# Patient Record
Sex: FEMALE | Race: WHITE | NOT HISPANIC OR LATINO | Employment: FULL TIME | URBAN - METROPOLITAN AREA
[De-identification: names, ages, dates, MRNs, and addresses within clinical notes are randomized per-mention and may not be internally consistent; named-entity substitution may affect disease eponyms.]

---

## 2018-09-27 LAB — GBS_EXT (HISTORICAL CONVERSION): POSITIVE

## 2018-10-08 ENCOUNTER — HOSPITAL ENCOUNTER (OUTPATIENT)
Dept: MEDSURG UNIT | Facility: CLINIC | Age: 28
Discharge: HOME | End: 2018-10-08
Attending: OBSTETRICS & GYNECOLOGY | Admitting: OBSTETRICS & GYNECOLOGY

## 2018-10-08 LAB — RUPTURE OF FETAL MEMBRANES BY ROM PLUS: NEGATIVE

## 2018-10-08 ASSESSMENT — MIFFLIN-ST. JEOR: SCORE: 1583.18

## 2018-10-12 ENCOUNTER — ANESTHESIA - HEALTHEAST (OUTPATIENT)
Dept: OBGYN | Facility: CLINIC | Age: 28
End: 2018-10-12

## 2018-10-15 ENCOUNTER — COMMUNICATION - HEALTHEAST (OUTPATIENT)
Dept: OBGYN | Facility: CLINIC | Age: 28
End: 2018-10-15

## 2018-11-23 ENCOUNTER — COMMUNICATION - HEALTHEAST (OUTPATIENT)
Dept: SCHEDULING | Facility: CLINIC | Age: 28
End: 2018-11-23

## 2021-05-03 ENCOUNTER — AMBULATORY - HEALTHEAST (OUTPATIENT)
Dept: OBGYN | Facility: CLINIC | Age: 31
End: 2021-05-03

## 2021-05-03 DIAGNOSIS — Z33.1 PREGNANT STATE, INCIDENTAL: ICD-10-CM

## 2021-05-08 ENCOUNTER — AMBULATORY - HEALTHEAST (OUTPATIENT)
Dept: FAMILY MEDICINE | Facility: CLINIC | Age: 31
End: 2021-05-08

## 2021-05-08 DIAGNOSIS — Z33.1 PREGNANT STATE, INCIDENTAL: ICD-10-CM

## 2021-05-08 LAB
SARS-COV-2 PCR COMMENT: NORMAL
SARS-COV-2 RNA SPEC QL NAA+PROBE: NEGATIVE
SARS-COV-2 VIRUS SPECIMEN SOURCE: NORMAL

## 2021-05-09 ENCOUNTER — COMMUNICATION - HEALTHEAST (OUTPATIENT)
Dept: SCHEDULING | Facility: CLINIC | Age: 31
End: 2021-05-09

## 2021-05-11 ENCOUNTER — HOSPITAL ENCOUNTER (OUTPATIENT)
Dept: OBGYN | Facility: CLINIC | Age: 31
Discharge: HOME | End: 2021-05-11

## 2021-05-11 ENCOUNTER — ANESTHESIA - HEALTHEAST (OUTPATIENT)
Dept: OBGYN | Facility: CLINIC | Age: 31
End: 2021-05-11

## 2021-05-13 ENCOUNTER — COMMUNICATION - HEALTHEAST (OUTPATIENT)
Dept: OBGYN | Facility: CLINIC | Age: 31
End: 2021-05-13

## 2021-05-20 ENCOUNTER — COMMUNICATION - HEALTHEAST (OUTPATIENT)
Dept: ADMINISTRATIVE | Facility: CLINIC | Age: 31
End: 2021-05-20

## 2021-05-20 ENCOUNTER — RECORDS - HEALTHEAST (OUTPATIENT)
Dept: ADMINISTRATIVE | Facility: OTHER | Age: 31
End: 2021-05-20

## 2021-05-20 RX ORDER — MUPIROCIN 20 MG/G
1 OINTMENT TOPICAL 4 TIMES DAILY PRN
Status: SHIPPED | COMMUNITY
Start: 2021-05-19 | End: 2024-04-02

## 2021-06-02 ENCOUNTER — RECORDS - HEALTHEAST (OUTPATIENT)
Dept: ADMINISTRATIVE | Facility: CLINIC | Age: 31
End: 2021-06-02

## 2021-06-02 VITALS — HEIGHT: 67 IN | WEIGHT: 182 LBS | BODY MASS INDEX: 28.56 KG/M2

## 2021-06-16 PROBLEM — Z12.4 CERVICAL CANCER SCREENING: Status: ACTIVE | Noted: 2017-05-22

## 2021-06-17 NOTE — TELEPHONE ENCOUNTER
OB Follow Up Phone Call    Patient: Gretchen Voss  : 1990  MRN: [id number]     Location [facility]  Provider Pippa Mcnamara MD      :   N/A    Language:   English    Discharge Follow-Up:  Follow-Up call by Outreach nurse: Message left for patient    Type of Delivery:      Feeding Method:  Breastfeeding    Comments:   Left message with [facility] Outreach phone number for patient to call back if desired. Reminded patient to schedule postpartum follow-up appointment as directed by PCP at discharge.  Encouraged patient to call clinic/PCP with questions or concerns.    Completed by:   Tyra Mckeon RN      Patient: Gretchen Voss  : 1990  MRN: [id number]

## 2021-06-17 NOTE — TELEPHONE ENCOUNTER
"Returned Gretchen's call:  Baby now 9 days old, and and has been nursing well, for about 30 min/session, and having good number of wet and soiled diapers.  Nipples have been comfortable.  However, for the last two feedings baby has been frustrated with feeding, \"screaming\" when brought to breast and unable to latch.  Gretchen applied nipple shield, which was effective and baby was able to latch.  Also tried calming with pacifier, which was briefly effective, but for second attempt pumped and gave small feeding in bottle.  Has not done any regular bottlefeeding.  Of note, Gretchen did not   attempt breastfeeding with first child, and moved to exclusive pumping after a short time with second because of nipple pain.      A:  Baby with difficulty latching    P:  Suggested calming baby before attempting latch with skin-to-skin. Explained that some babies can become frustrated seeking the nipple and need a bit more guidance--Discussed \"sandwich\" hold for breast, tucking baby's chin into breast and bringing baby's head onto breast for deep latch.  If baby still upset and having difficulty latching, could use nipple shield, or offer a small amount of pumped milk for calming and try again.  Offered in-person lactation consult if needed. Gretchen will consider.  "

## 2021-06-17 NOTE — PLAN OF CARE
Dr Mcnamara states to start high dose pitocin but ok to modify rate to obtain contractions every 2-3 minutes.

## 2021-06-17 NOTE — ANESTHESIA PROCEDURE NOTES
Epidural Block    Patient location during procedure: OB  Time Called: 5/11/2021 12:30 PM  Reason for Block:labor epidural  Staffing:  Performing  Anesthesiologist: Chong Sales MD  Preanesthetic Checklist  Completed: patient identified, risks, benefits, and alternatives discussed, timeout performed, consent obtained, at patient's request, airway assessed, oxygen available, suction available, emergency drugs available and hand hygiene performed  Procedure  Patient position: sitting  Prep: ChloraPrep and site prepped and draped  Patient monitoring: continuous pulse oximetry, heart rate and blood pressure  Approach: midline  Location: L3-L4  Injection technique: SASKIA air  Number of Attempts:1  Needle  Needle type: Cami   Needle gauge: 18 G     Catheter in Space: 4  Assessment  Sensory level: T10  No complications

## 2021-06-17 NOTE — ANESTHESIA POSTPROCEDURE EVALUATION
Patient: Gretchen Voss  * No procedures listed *  Anesthesia type: epidural    Patient location: Labor and Delivery  Last vitals:   Vitals Value Taken Time   /62 05/11/21 1750   Temp  05/11/21 1801   Pulse 73 05/11/21 1750   Resp  05/11/21 1801   SpO2  05/11/21 1801   Vitals shown include unvalidated device data.  Post vital signs: stable  Level of consciousness: awake and responds to simple questions  Post-anesthesia pain: pain controlled  Post-anesthesia nausea and vomiting: no  Pulmonary: unassisted, return to baseline  Cardiovascular: stable and blood pressure at baseline  Hydration: adequate  Anesthetic events: no

## 2021-06-17 NOTE — ANESTHESIA PREPROCEDURE EVALUATION
Anesthesia Evaluation      Patient summary reviewed   No history of anesthetic complications     Airway   Mallampati: I  Neck ROM: full   Pulmonary - negative ROS                          Cardiovascular - negative ROS   Neuro/Psych - negative ROS     Endo/Other    (+) pregnant     GI/Hepatic/Renal - negative ROS           Dental - normal exam                        Anesthesia Plan  Planned anesthetic: epidural    ASA 2     Anesthetic plan and risks discussed with: patient    Post-op plan: routine recovery

## 2021-06-20 NOTE — PROGRESS NOTES
Patient presents to [facility] triage with complaints of wet spot in underwear this am.  ROM test negative.  FHR reactive category 1 tracing, no contractions.  Dr. JENNA Lipscomb updated of same.   Will DC patient to home.

## 2021-06-21 NOTE — ANESTHESIA PREPROCEDURE EVALUATION
Anesthesia Evaluation      Patient summary reviewed     Airway   Mallampati: II  Neck ROM: full   Pulmonary - negative ROS                          Cardiovascular - negative ROS   Neuro/Psych      Endo/Other    (+) pregnant     GI/Hepatic/Renal - negative ROS           Dental                         Anesthesia Plan  Planned anesthetic: epidural  Labor epidural  ASA 2     Anesthetic plan and risks discussed with: patient and spouse    Post-op plan: routine recovery        Patient requests labor analgesia.  Patient identified.  Medical history reviewed; lab results and allergies reviewed.  Patient interviewed and examined.  Risks(including headache, back pain, low blood pressure, high block and related complications, failed block and/or inadequate analgesia, infection, bleeding and nerve injury), alternatives and procedure discussed and questions answered. Patient gives consent for epidural.  Correct patient, procedure/site verified.Hands washed, sterile gloves and mask worn.

## 2021-06-21 NOTE — ANESTHESIA POSTPROCEDURE EVALUATION
Patient: Gretchen Voss  * No procedures listed *  Anesthesia type: epidural    Patient location: Labor and Delivery  Last vitals:   Vitals:    10/12/18 2105   BP: 127/74   Pulse: 75   Resp:    Temp:    SpO2:      Post vital signs: stable  Level of consciousness: awake and responds to simple questions  Post-anesthesia pain: pain controlled  Post-anesthesia nausea and vomiting: no  Pulmonary: unassisted, return to baseline  Cardiovascular: stable and blood pressure at baseline  Hydration: adequate  Anesthetic events: no    QCDR Measures:  ASA# 11 - Lyla-op Cardiac Arrest: ASA11B - Patient did NOT experience unanticipated cardiac arrest  ASA# 12 - Lyla-op Mortality Rate: ASA12B - Patient did NOT die  ASA# 13 - PACU Re-Intubation Rate: NA - No ETT / LMA used for case  ASA# 10 - Composite Anes Safety: ASA10A - No serious adverse event    Additional Notes:  Recovering well after labor epidural, no apparent complications.  
Detail Level: Detailed

## 2021-06-21 NOTE — ANESTHESIA PROCEDURE NOTES
Epidural Block    Patient location during procedure: OB  Time Called: 10/12/2018 4:05 PM  Reason for Block:labor epidural  Staffing:  Performing  Anesthesiologist: MATTHEW HICKS  Preanesthetic Checklist  Completed: patient identified, risks, benefits, and alternatives discussed, timeout performed, consent obtained, at patient's request, airway assessed, oxygen available, suction available, emergency drugs available and hand hygiene performed  Procedure  Patient position: sitting  Prep: ChloraPrep  Patient monitoring: continuous pulse oximetry and blood pressure  Approach: midline  Location: L2-L3  Injection technique: SASKIA saline  Number of Attempts:1  Needle  Needle type: Cami   Needle gauge: 18 G     Catheter in Space: 4  Assessment  Sensory level:  No complications

## 2021-07-14 PROBLEM — Z34.90 PREGNANT: Status: RESOLVED | Noted: 2018-10-12 | Resolved: 2021-05-11

## 2024-04-02 ENCOUNTER — HOSPITAL ENCOUNTER (OUTPATIENT)
Facility: CLINIC | Age: 34
Setting detail: OBSERVATION
Discharge: HOME | End: 2024-04-03
Attending: EMERGENCY MEDICINE | Admitting: OBSTETRICS & GYNECOLOGY
Payer: COMMERCIAL

## 2024-04-02 ENCOUNTER — APPOINTMENT (OUTPATIENT)
Dept: CT IMAGING | Facility: CLINIC | Age: 34
End: 2024-04-02
Attending: PHYSICIAN ASSISTANT
Payer: COMMERCIAL

## 2024-04-02 ENCOUNTER — APPOINTMENT (OUTPATIENT)
Dept: ULTRASOUND IMAGING | Facility: CLINIC | Age: 34
End: 2024-04-02
Attending: PHYSICIAN ASSISTANT
Payer: COMMERCIAL

## 2024-04-02 DIAGNOSIS — N83.202 HEMORRHAGIC CYST OF LEFT OVARY: ICD-10-CM

## 2024-04-02 DIAGNOSIS — K66.1 HEMOPERITONEUM: ICD-10-CM

## 2024-04-02 DIAGNOSIS — R10.84 DIFFUSE ABDOMINAL PAIN: ICD-10-CM

## 2024-04-02 DIAGNOSIS — R07.89 RIGHT-SIDED CHEST WALL PAIN: ICD-10-CM

## 2024-04-02 LAB
ABO/RH(D): NORMAL
ALBUMIN SERPL BCG-MCNC: 4.6 G/DL (ref 3.5–5.2)
ALBUMIN UR-MCNC: 10 MG/DL
ALP SERPL-CCNC: 33 U/L (ref 40–150)
ALT SERPL W P-5'-P-CCNC: 9 U/L (ref 0–50)
ANION GAP SERPL CALCULATED.3IONS-SCNC: 9 MMOL/L (ref 7–15)
ANTIBODY SCREEN: NEGATIVE
APPEARANCE UR: CLEAR
AST SERPL W P-5'-P-CCNC: 12 U/L (ref 0–45)
ATRIAL RATE - MUSE: 101 BPM
BASOPHILS # BLD AUTO: 0 10E3/UL (ref 0–0.2)
BASOPHILS NFR BLD AUTO: 0 %
BILIRUB DIRECT SERPL-MCNC: <0.2 MG/DL (ref 0–0.3)
BILIRUB SERPL-MCNC: 0.8 MG/DL
BILIRUB UR QL STRIP: NEGATIVE
BUN SERPL-MCNC: 16.6 MG/DL (ref 6–20)
CALCIUM SERPL-MCNC: 10.2 MG/DL (ref 8.6–10)
CHLORIDE SERPL-SCNC: 105 MMOL/L (ref 98–107)
COLOR UR AUTO: YELLOW
CREAT SERPL-MCNC: 0.81 MG/DL (ref 0.51–0.95)
CRP SERPL-MCNC: 3.44 MG/L
D DIMER PPP FEU-MCNC: 0.76 UG/ML FEU (ref 0–0.5)
DEPRECATED HCO3 PLAS-SCNC: 27 MMOL/L (ref 22–29)
DIASTOLIC BLOOD PRESSURE - MUSE: NORMAL MMHG
EGFRCR SERPLBLD CKD-EPI 2021: >90 ML/MIN/1.73M2
EOSINOPHIL # BLD AUTO: 0 10E3/UL (ref 0–0.7)
EOSINOPHIL NFR BLD AUTO: 0 %
ERYTHROCYTE [DISTWIDTH] IN BLOOD BY AUTOMATED COUNT: 12.1 % (ref 10–15)
ERYTHROCYTE [DISTWIDTH] IN BLOOD BY AUTOMATED COUNT: 12.2 % (ref 10–15)
GLUCOSE SERPL-MCNC: 111 MG/DL (ref 70–99)
GLUCOSE UR STRIP-MCNC: NEGATIVE MG/DL
HCG UR QL: NEGATIVE
HCT VFR BLD AUTO: 31.5 % (ref 35–47)
HCT VFR BLD AUTO: 34.6 % (ref 35–47)
HGB BLD-MCNC: 10.6 G/DL (ref 11.7–15.7)
HGB BLD-MCNC: 10.9 G/DL (ref 11.7–15.7)
HGB BLD-MCNC: 11.5 G/DL (ref 11.7–15.7)
HGB UR QL STRIP: NEGATIVE
IMM GRANULOCYTES # BLD: 0 10E3/UL
IMM GRANULOCYTES NFR BLD: 0 %
INTERPRETATION ECG - MUSE: NORMAL
KETONES UR STRIP-MCNC: NEGATIVE MG/DL
LEUKOCYTE ESTERASE UR QL STRIP: NEGATIVE
LIPASE SERPL-CCNC: 21 U/L (ref 13–60)
LYMPHOCYTES # BLD AUTO: 1.1 10E3/UL (ref 0.8–5.3)
LYMPHOCYTES NFR BLD AUTO: 10 %
MAGNESIUM SERPL-MCNC: 2 MG/DL (ref 1.7–2.3)
MCH RBC QN AUTO: 31.3 PG (ref 26.5–33)
MCH RBC QN AUTO: 31.6 PG (ref 26.5–33)
MCHC RBC AUTO-ENTMCNC: 33.2 G/DL (ref 31.5–36.5)
MCHC RBC AUTO-ENTMCNC: 33.7 G/DL (ref 31.5–36.5)
MCV RBC AUTO: 94 FL (ref 78–100)
MCV RBC AUTO: 94 FL (ref 78–100)
MONOCYTES # BLD AUTO: 0.6 10E3/UL (ref 0–1.3)
MONOCYTES NFR BLD AUTO: 6 %
MUCOUS THREADS #/AREA URNS LPF: PRESENT /LPF
NEUTROPHILS # BLD AUTO: 8.4 10E3/UL (ref 1.6–8.3)
NEUTROPHILS NFR BLD AUTO: 84 %
NITRATE UR QL: NEGATIVE
NRBC # BLD AUTO: 0 10E3/UL
NRBC BLD AUTO-RTO: 0 /100
P AXIS - MUSE: 67 DEGREES
PH UR STRIP: 5.5 [PH] (ref 5–7)
PLATELET # BLD AUTO: 265 10E3/UL (ref 150–450)
PLATELET # BLD AUTO: 309 10E3/UL (ref 150–450)
POTASSIUM SERPL-SCNC: 3.7 MMOL/L (ref 3.4–5.3)
PR INTERVAL - MUSE: 124 MS
PROT SERPL-MCNC: 7.1 G/DL (ref 6.4–8.3)
QRS DURATION - MUSE: 86 MS
QT - MUSE: 334 MS
QTC - MUSE: 433 MS
R AXIS - MUSE: -45 DEGREES
RADIOLOGIST FLAGS: ABNORMAL
RBC # BLD AUTO: 3.35 10E6/UL (ref 3.8–5.2)
RBC # BLD AUTO: 3.67 10E6/UL (ref 3.8–5.2)
RBC URINE: 1 /HPF
SODIUM SERPL-SCNC: 141 MMOL/L (ref 135–145)
SP GR UR STRIP: 1.03 (ref 1–1.03)
SPECIMEN EXPIRATION DATE: NORMAL
SQUAMOUS EPITHELIAL: 4 /HPF
SYSTOLIC BLOOD PRESSURE - MUSE: NORMAL MMHG
T AXIS - MUSE: 46 DEGREES
TROPONIN T SERPL HS-MCNC: <6 NG/L
UROBILINOGEN UR STRIP-MCNC: <2 MG/DL
VENTRICULAR RATE- MUSE: 101 BPM
WBC # BLD AUTO: 10.1 10E3/UL (ref 4–11)
WBC # BLD AUTO: 9.2 10E3/UL (ref 4–11)
WBC URINE: 5 /HPF

## 2024-04-02 PROCEDURE — 83735 ASSAY OF MAGNESIUM: CPT | Performed by: PHYSICIAN ASSISTANT

## 2024-04-02 PROCEDURE — 86140 C-REACTIVE PROTEIN: CPT | Performed by: PHYSICIAN ASSISTANT

## 2024-04-02 PROCEDURE — 84484 ASSAY OF TROPONIN QUANT: CPT | Performed by: PHYSICIAN ASSISTANT

## 2024-04-02 PROCEDURE — 93005 ELECTROCARDIOGRAM TRACING: CPT | Performed by: PHYSICIAN ASSISTANT

## 2024-04-02 PROCEDURE — 99291 CRITICAL CARE FIRST HOUR: CPT | Mod: 25

## 2024-04-02 PROCEDURE — 36415 COLL VENOUS BLD VENIPUNCTURE: CPT | Performed by: PHYSICIAN ASSISTANT

## 2024-04-02 PROCEDURE — 80053 COMPREHEN METABOLIC PANEL: CPT | Performed by: PHYSICIAN ASSISTANT

## 2024-04-02 PROCEDURE — 96375 TX/PRO/DX INJ NEW DRUG ADDON: CPT | Mod: 59

## 2024-04-02 PROCEDURE — 74177 CT ABD & PELVIS W/CONTRAST: CPT

## 2024-04-02 PROCEDURE — 81025 URINE PREGNANCY TEST: CPT | Performed by: PHYSICIAN ASSISTANT

## 2024-04-02 PROCEDURE — 250N000011 HC RX IP 250 OP 636: Performed by: PHYSICIAN ASSISTANT

## 2024-04-02 PROCEDURE — 96374 THER/PROPH/DIAG INJ IV PUSH: CPT | Mod: 59

## 2024-04-02 PROCEDURE — 258N000003 HC RX IP 258 OP 636: Performed by: PHYSICIAN ASSISTANT

## 2024-04-02 PROCEDURE — 86900 BLOOD TYPING SEROLOGIC ABO: CPT | Performed by: PHYSICIAN ASSISTANT

## 2024-04-02 PROCEDURE — 83690 ASSAY OF LIPASE: CPT | Performed by: PHYSICIAN ASSISTANT

## 2024-04-02 PROCEDURE — 85379 FIBRIN DEGRADATION QUANT: CPT | Performed by: PHYSICIAN ASSISTANT

## 2024-04-02 PROCEDURE — 85018 HEMOGLOBIN: CPT | Performed by: PHYSICIAN ASSISTANT

## 2024-04-02 PROCEDURE — 36415 COLL VENOUS BLD VENIPUNCTURE: CPT | Performed by: STUDENT IN AN ORGANIZED HEALTH CARE EDUCATION/TRAINING PROGRAM

## 2024-04-02 PROCEDURE — 82248 BILIRUBIN DIRECT: CPT | Performed by: PHYSICIAN ASSISTANT

## 2024-04-02 PROCEDURE — 85041 AUTOMATED RBC COUNT: CPT | Performed by: PHYSICIAN ASSISTANT

## 2024-04-02 PROCEDURE — 250N000011 HC RX IP 250 OP 636: Performed by: STUDENT IN AN ORGANIZED HEALTH CARE EDUCATION/TRAINING PROGRAM

## 2024-04-02 PROCEDURE — 96361 HYDRATE IV INFUSION ADD-ON: CPT

## 2024-04-02 PROCEDURE — 81001 URINALYSIS AUTO W/SCOPE: CPT | Performed by: PHYSICIAN ASSISTANT

## 2024-04-02 PROCEDURE — 76830 TRANSVAGINAL US NON-OB: CPT

## 2024-04-02 PROCEDURE — G0378 HOSPITAL OBSERVATION PER HR: HCPCS

## 2024-04-02 PROCEDURE — 85018 HEMOGLOBIN: CPT | Performed by: STUDENT IN AN ORGANIZED HEALTH CARE EDUCATION/TRAINING PROGRAM

## 2024-04-02 RX ORDER — IBUPROFEN 600 MG/1
600 TABLET, FILM COATED ORAL EVERY 6 HOURS PRN
Status: DISCONTINUED | OUTPATIENT
Start: 2024-04-02 | End: 2024-04-03 | Stop reason: HOSPADM

## 2024-04-02 RX ORDER — IOPAMIDOL 755 MG/ML
90 INJECTION, SOLUTION INTRAVASCULAR ONCE
Status: COMPLETED | OUTPATIENT
Start: 2024-04-02 | End: 2024-04-02

## 2024-04-02 RX ORDER — ONDANSETRON 4 MG/1
4 TABLET, ORALLY DISINTEGRATING ORAL EVERY 6 HOURS PRN
Status: DISCONTINUED | OUTPATIENT
Start: 2024-04-02 | End: 2024-04-03 | Stop reason: HOSPADM

## 2024-04-02 RX ORDER — MORPHINE SULFATE 4 MG/ML
4 INJECTION, SOLUTION INTRAMUSCULAR; INTRAVENOUS ONCE
Status: COMPLETED | OUTPATIENT
Start: 2024-04-02 | End: 2024-04-02

## 2024-04-02 RX ORDER — LIDOCAINE 40 MG/G
CREAM TOPICAL
Status: DISCONTINUED | OUTPATIENT
Start: 2024-04-02 | End: 2024-04-03 | Stop reason: HOSPADM

## 2024-04-02 RX ORDER — AMOXICILLIN 250 MG
1 CAPSULE ORAL 2 TIMES DAILY PRN
Status: DISCONTINUED | OUTPATIENT
Start: 2024-04-02 | End: 2024-04-03 | Stop reason: HOSPADM

## 2024-04-02 RX ORDER — HYDROMORPHONE HYDROCHLORIDE 1 MG/ML
0.2 INJECTION, SOLUTION INTRAMUSCULAR; INTRAVENOUS; SUBCUTANEOUS
Status: DISCONTINUED | OUTPATIENT
Start: 2024-04-02 | End: 2024-04-03 | Stop reason: HOSPADM

## 2024-04-02 RX ORDER — MAGNESIUM HYDROXIDE/ALUMINUM HYDROXICE/SIMETHICONE 120; 1200; 1200 MG/30ML; MG/30ML; MG/30ML
30 SUSPENSION ORAL EVERY 4 HOURS PRN
Status: DISCONTINUED | OUTPATIENT
Start: 2024-04-02 | End: 2024-04-03 | Stop reason: HOSPADM

## 2024-04-02 RX ORDER — ONDANSETRON 2 MG/ML
4 INJECTION INTRAMUSCULAR; INTRAVENOUS EVERY 6 HOURS PRN
Status: DISCONTINUED | OUTPATIENT
Start: 2024-04-02 | End: 2024-04-03 | Stop reason: HOSPADM

## 2024-04-02 RX ORDER — OXYCODONE HYDROCHLORIDE 5 MG/1
5 TABLET ORAL EVERY 4 HOURS PRN
Status: DISCONTINUED | OUTPATIENT
Start: 2024-04-02 | End: 2024-04-03 | Stop reason: HOSPADM

## 2024-04-02 RX ORDER — CALCIUM CARBONATE 500 MG/1
1000 TABLET, CHEWABLE ORAL 4 TIMES DAILY PRN
Status: DISCONTINUED | OUTPATIENT
Start: 2024-04-02 | End: 2024-04-03 | Stop reason: HOSPADM

## 2024-04-02 RX ORDER — PROCHLORPERAZINE 25 MG
25 SUPPOSITORY, RECTAL RECTAL EVERY 12 HOURS PRN
Status: DISCONTINUED | OUTPATIENT
Start: 2024-04-02 | End: 2024-04-03 | Stop reason: HOSPADM

## 2024-04-02 RX ORDER — ACETAMINOPHEN 650 MG/1
650 SUPPOSITORY RECTAL EVERY 4 HOURS PRN
Status: DISCONTINUED | OUTPATIENT
Start: 2024-04-02 | End: 2024-04-03 | Stop reason: HOSPADM

## 2024-04-02 RX ORDER — PROCHLORPERAZINE MALEATE 10 MG
10 TABLET ORAL EVERY 6 HOURS PRN
Status: DISCONTINUED | OUTPATIENT
Start: 2024-04-02 | End: 2024-04-03 | Stop reason: HOSPADM

## 2024-04-02 RX ORDER — AMOXICILLIN 250 MG
2 CAPSULE ORAL 2 TIMES DAILY PRN
Status: DISCONTINUED | OUTPATIENT
Start: 2024-04-02 | End: 2024-04-03 | Stop reason: HOSPADM

## 2024-04-02 RX ORDER — HYDROXYZINE HYDROCHLORIDE 25 MG/1
25 TABLET, FILM COATED ORAL EVERY 6 HOURS PRN
Status: DISCONTINUED | OUTPATIENT
Start: 2024-04-02 | End: 2024-04-03 | Stop reason: HOSPADM

## 2024-04-02 RX ORDER — HYDROXYZINE HYDROCHLORIDE 25 MG/1
50 TABLET, FILM COATED ORAL EVERY 6 HOURS PRN
Status: DISCONTINUED | OUTPATIENT
Start: 2024-04-02 | End: 2024-04-03 | Stop reason: HOSPADM

## 2024-04-02 RX ORDER — ACETAMINOPHEN 325 MG/1
650 TABLET ORAL EVERY 4 HOURS PRN
Status: DISCONTINUED | OUTPATIENT
Start: 2024-04-02 | End: 2024-04-03 | Stop reason: HOSPADM

## 2024-04-02 RX ORDER — HYDROMORPHONE HYDROCHLORIDE 1 MG/ML
0.4 INJECTION, SOLUTION INTRAMUSCULAR; INTRAVENOUS; SUBCUTANEOUS
Status: DISCONTINUED | OUTPATIENT
Start: 2024-04-02 | End: 2024-04-03 | Stop reason: HOSPADM

## 2024-04-02 RX ADMIN — SODIUM CHLORIDE 1000 ML: 9 INJECTION, SOLUTION INTRAVENOUS at 12:16

## 2024-04-02 RX ADMIN — FAMOTIDINE 20 MG: 10 INJECTION, SOLUTION INTRAVENOUS at 19:15

## 2024-04-02 RX ADMIN — MORPHINE SULFATE 4 MG: 4 INJECTION, SOLUTION INTRAMUSCULAR; INTRAVENOUS at 14:44

## 2024-04-02 RX ADMIN — IOPAMIDOL 90 ML: 755 INJECTION, SOLUTION INTRAVENOUS at 13:49

## 2024-04-02 RX ADMIN — HYDROMORPHONE HYDROCHLORIDE 0.4 MG: 1 INJECTION, SOLUTION INTRAMUSCULAR; INTRAVENOUS; SUBCUTANEOUS at 19:21

## 2024-04-02 ASSESSMENT — ACTIVITIES OF DAILY LIVING (ADL)
ADLS_ACUITY_SCORE: 33
ADLS_ACUITY_SCORE: 35

## 2024-04-02 ASSESSMENT — COLUMBIA-SUICIDE SEVERITY RATING SCALE - C-SSRS
2. HAVE YOU ACTUALLY HAD ANY THOUGHTS OF KILLING YOURSELF IN THE PAST MONTH?: NO
1. IN THE PAST MONTH, HAVE YOU WISHED YOU WERE DEAD OR WISHED YOU COULD GO TO SLEEP AND NOT WAKE UP?: NO
6. HAVE YOU EVER DONE ANYTHING, STARTED TO DO ANYTHING, OR PREPARED TO DO ANYTHING TO END YOUR LIFE?: NO

## 2024-04-02 NOTE — MEDICATION SCRIBE - ADMISSION MEDICATION HISTORY
Medication Scribe Admission Medication History    Admission medication history is complete. The information provided in this note is only as accurate as the sources available at the time of the update.    Information Source(s): Patient and CareEverywhere/SureScripts via in-person    Pertinent Information: Patient reports she is not taking any Rx or OTC medications currently.     Last PO intake was at 1000 today.     Changes made to PTA medication list:  Added: None  Deleted:   Mupirocin 2% ointment  Prenatal vitamin   Changed: None    Allergies reviewed with patient and updates made in EHR: yes    Medication History Completed By: Sonny Bell 4/2/2024 4:13 PM    No outpatient medications have been marked as taking for the 4/2/24 encounter (Hospital Encounter).

## 2024-04-02 NOTE — ED TRIAGE NOTES
Pt reports generalized abdominal pain/bloating with diarrhea that woke her up this morning at 0100.  Pt reports feeling short of breath and dizzy.  Pt reports 4 episodes of diarrhea.

## 2024-04-02 NOTE — ED PROVIDER NOTES
EMERGENCY DEPARTMENT ENCOUNTER      NAME: Gretchen Voss  AGE: 33 year old female  YOB: 1990  MRN: [id number]  EVALUATION DATE & TIME: 4/2/2024 11:58 AM    PCP: Kiesha Barboza        Chief Complaint   Patient presents with    Abdominal Pain     IMPRESSION  1. Diffuse abdominal pain    2. Right-sided chest wall pain    3. Hemoperitoneum    4. Hemorrhagic cyst of left ovary        PLAN  - admit to OB/Gyn for further care    ED COURSE & MEDICAL DECISION MAKING    4:04 I was consulted by Deejay Franklin on this patient. I reviewed ED presentation history, assessment, management, plan to this point. Please see ED LAURA note for details.    Briefly, 33yoF otherwise healthy presenting with 1 day of abdominal pain. Found to have hemorrhagic ovarian cyst with increased hemoperitoneum during 1.5-hour interval between initial CT abdominal pain & follow up pelvic US. Hemodynamically stable. Hemoglobin decreased from 11.5 to 10.9. Hemodynamically stable on recheck with mild lower abdominal tenderness. OB/Gyn discussed with by ED PA; does not recommend surgery at this time. Recommend observation given evidence of ongoing bleeding with worsening hemoglobin. Admission to OB/Gyn arranged per ED PA; no further questions at the time of admission.    This patient involved a high degree of complexity in medical decision making, as significant risks were present and assessed. Recent encounters & results in medical record reviewed by me.    All workup (i.e. any EKG/labs/imaging as per charting below) reviewed and independently interpreted by me. See respective sections for details.      I had a face to face encounter with this patient seen by the Advanced Practice Provider (LAURA). I personally made/approved the management plan and take responsibility for the patient management. I personally saw patient and performed a substantive portion of the visit including all aspects of the medical decision making.    MEDICATIONS GIVEN  IN THE EMERGENCY DEPARTMENT  Medications   sodium chloride 0.9% BOLUS 1,000 mL (0 mLs Intravenous Stopped 4/2/24 1457)   iopamidol (ISOVUE-370) solution 90 mL (90 mLs Intravenous $Given 4/2/24 1349)   morphine (PF) injection 4 mg (4 mg Intravenous $Given 4/2/24 1444)               =================================================================      HPI  Use of :         Gretchen Voss is a 33 year old female with a pertinent history of anxiety, elevated TSH, and thyroid disease who presents to this ED for evaluation of generalized abdominal pain.     She reports generalized abdomina pain, bloating, diarrhea that woke her up at around 1 AM this morning.  Patient reportedly also feeling somewhat short of breath and dizzy.  Has had 4 sets of diarrhea.  Patient denies feeling nauseous.  No blood in her stool.  No one is at home sick.  Denies any other medical history.  No fevers.  No recent long plane rides or travel.  Not on any hormonal treatment, or birth control.  Denies any chance of pregnancy.     --------------- MEDICAL HISTORY ---------------  PAST MEDICAL HISTORY:  Reviewed by me.  No past medical history on file.  Patient Active Problem List   Diagnosis    Normal delivery    Allergic rhinitis    Anxiety    Cervical cancer screening    Elevated TSH    Family history of thyroid disease       PAST SURGICAL HISTORY:  Reviewed by me.  No past surgical history on file.    CURRENT MEDICATIONS:    Reviewed by me.  No current facility-administered medications for this encounter.  No current outpatient medications on file.    ALLERGIES:  Reviewed by me.  Allergies   Allergen Reactions    Cephalexin Hives     Pt states she doesn't remember what her allergy was.       FAMILY HISTORY:  Reviewed by me.  No family history on file.    SOCIAL HISTORY:   Reviewed by me.  Social History     Socioeconomic History    Marital status:    Tobacco Use    Smoking status: Never    Smokeless tobacco: Never   Substance  and Sexual Activity    Alcohol use: No    Drug use: No    Sexual activity: Yes     Partners: Male         --------------- PHYSICAL EXAM ---------------  Nursing notes and vitals independently reviewed by me.  VITALS:  Vitals:    04/02/24 1241 04/02/24 1403 04/02/24 1418 04/02/24 1446   BP:    127/76   Pulse: 93 82 82 92   Resp: 18      Temp:       TempSrc:       SpO2: 99% 100% 100% 100%   Weight:       Height:           PHYSICAL EXAM:    General:  alert, interactive, no distress  Eyes:  conjunctivae clear, conjugate gaze  HENT:  atraumatic, nose with no rhinorrhea, oropharynx clear  Neck:  no meningismus  Cardiovascular:  HR 80s during exam, regular rhythm, no murmurs, brisk cap refill  Chest:  no chest wall tenderness  Pulmonary:  no stridor, normal phonation, normal work of breathing, clear lungs bilaterally  Abdomen: soft, nondistended, mild lower abdominal tenderness with no rebound or guarding  :  no CVA tenderness  Back:  no midline spinal tenderness  Musculoskeletal:  no pretibial edema, no calf tenderness. Gross ROM intact to joints of extremities with no obvious deformities.  Skin:  warm, dry, no rash  Neuro:  awake, alert, answers questions appropriately, follows commands, moves all limbs  Psych:  calm, normal affect      --------------- RESULTS ---------------  LAB:  Reviewed and independently interpreted by me.  Results for orders placed or performed during the hospital encounter of 04/02/24   CT Chest (PE) Abdomen Pelvis w Contrast    Impression    IMPRESSION:   1.  Moderate to large volume of pelvic hemoperitoneum (and minimal perihepatic and right subdiaphragmatic hemoperitoneum) with epicenter appearing to be at the left adnexa where there is a 3 cm circumscribed benign-appearing cyst and numerous tortuous   dilated left gonadal vein although the definite source of bleeding is not identified. Recommend gynecology consultation and pelvic ultrasound. MRI pelvis may be helpful if the source of  bleeding is not identified at ultrasound.  2.  No pulmonary emboli.  3.  Colonic diverticulosis with no diverticulitis.  4.  Chest, abdomen and pelvis otherwise normal.   US Pelvis Cmplt w Transvag & Doppler LmtPel Duplex Limited   Result Value Ref Range    Radiologist flags Suspected active bleeding in the pelvis. (Urgent)     Impression    IMPRESSION:    1.  Large amount of fluid in the pelvis likely representing blood. The volume of blood appears to increase during the study. A site of active bleeding is not appreciated on this study, however.  2.  3.3 cm left ovarian hemorrhagic cyst.      [Urgent Result: Suspected active bleeding in the pelvis.]    Finding was identified on 4/2/2024 3:49 PM CDT.     Jag Franklin was contacted by me on 4/2/2024 3:59 PM CDT and verbalized understanding of the critical result.     Basic metabolic panel   Result Value Ref Range    Sodium 141 135 - 145 mmol/L    Potassium 3.7 3.4 - 5.3 mmol/L    Chloride 105 98 - 107 mmol/L    Carbon Dioxide (CO2) 27 22 - 29 mmol/L    Anion Gap 9 7 - 15 mmol/L    Urea Nitrogen 16.6 6.0 - 20.0 mg/dL    Creatinine 0.81 0.51 - 0.95 mg/dL    GFR Estimate >90 >60 mL/min/1.73m2    Calcium 10.2 (H) 8.6 - 10.0 mg/dL    Glucose 111 (H) 70 - 99 mg/dL   Hepatic panel   Result Value Ref Range    Protein Total 7.1 6.4 - 8.3 g/dL    Albumin 4.6 3.5 - 5.2 g/dL    Bilirubin Total 0.8 <=1.2 mg/dL    Alkaline Phosphatase 33 (L) 40 - 150 U/L    AST 12 0 - 45 U/L    ALT 9 0 - 50 U/L    Bilirubin Direct <0.20 0.00 - 0.30 mg/dL   Result Value Ref Range    Lipase 21 13 - 60 U/L   Troponin T, High Sensitivity (now)   Result Value Ref Range    Troponin T, High Sensitivity <6 <=14 ng/L   Result Value Ref Range    Magnesium 2.0 1.7 - 2.3 mg/dL   D dimer quantitative   Result Value Ref Range    D-Dimer Quantitative 0.76 (H) 0.00 - 0.50 ug/mL FEU   Result Value Ref Range    CRP Inflammation 3.44 <5.00 mg/L   UA with Microscopic reflex to Culture    Specimen: Urine, Clean  Catch   Result Value Ref Range    Color Urine Yellow Colorless, Straw, Light Yellow, Yellow    Appearance Urine Clear Clear    Glucose Urine Negative Negative mg/dL    Bilirubin Urine Negative Negative    Ketones Urine Negative Negative mg/dL    Specific Gravity Urine 1.033 (H) 1.001 - 1.030    Blood Urine Negative Negative    pH Urine 5.5 5.0 - 7.0    Protein Albumin Urine 10 (A) Negative mg/dL    Urobilinogen Urine <2.0 <2.0 mg/dL    Nitrite Urine Negative Negative    Leukocyte Esterase Urine Negative Negative    Mucus Urine Present (A) None Seen /LPF    RBC Urine 1 <=2 /HPF    WBC Urine 5 <=5 /HPF    Squamous Epithelials Urine 4 (H) <=1 /HPF   HCG qualitative urine   Result Value Ref Range    hCG Urine Qualitative Negative Negative   CBC with platelets and differential   Result Value Ref Range    WBC Count 10.1 4.0 - 11.0 10e3/uL    RBC Count 3.67 (L) 3.80 - 5.20 10e6/uL    Hemoglobin 11.5 (L) 11.7 - 15.7 g/dL    Hematocrit 34.6 (L) 35.0 - 47.0 %    MCV 94 78 - 100 fL    MCH 31.3 26.5 - 33.0 pg    MCHC 33.2 31.5 - 36.5 g/dL    RDW 12.1 10.0 - 15.0 %    Platelet Count 309 150 - 450 10e3/uL    % Neutrophils 84 %    % Lymphocytes 10 %    % Monocytes 6 %    % Eosinophils 0 %    % Basophils 0 %    % Immature Granulocytes 0 %    NRBCs per 100 WBC 0 <1 /100    Absolute Neutrophils 8.4 (H) 1.6 - 8.3 10e3/uL    Absolute Lymphocytes 1.1 0.8 - 5.3 10e3/uL    Absolute Monocytes 0.6 0.0 - 1.3 10e3/uL    Absolute Eosinophils 0.0 0.0 - 0.7 10e3/uL    Absolute Basophils 0.0 0.0 - 0.2 10e3/uL    Absolute Immature Granulocytes 0.0 <=0.4 10e3/uL    Absolute NRBCs 0.0 10e3/uL   Result Value Ref Range    Hemoglobin 10.9 (L) 11.7 - 15.7 g/dL   Adult Type and Screen   Result Value Ref Range    ABO/RH(D) A POS     SPECIMEN EXPIRATION DATE [id number]        RADIOLOGY:  Reviewed and independently interpreted by me. Please see official radiology report.  Recent Results (from the past 24 hour(s))   CT Chest (PE) Abdomen Pelvis w  Contrast    Narrative    EXAM: CT CHEST PE ABDOMEN PELVIS W CONTRAST  LOCATION: [facility]  DATE: 4/2/2024    INDICATION: Elevated d-dimer, shortness of breath and diffuse right-sided abdominal pain. Negative pregnancy test.  COMPARISON: None.  TECHNIQUE: CT chest pulmonary angiogram and routine CT abdomen pelvis with IV contrast. Arterial phase through the chest and venous phase through the abdomen and pelvis. Multiplanar reformats and MIP reconstructions were performed. Dose reduction   techniques were used.   CONTRAST: 90ml Isovue 370    FINDINGS:  ANGIOGRAM CHEST: Pulmonary arteries are normal caliber with no pulmonary emboli. Normal caliber thoracic aorta with no acute aortic syndrome.     LUNGS AND PLEURA: Lungs are clear. No pleural effusion.    MEDIASTINUM/AXILLAE: No lymphadenopathy. Heart size within normal limits. No pericardial effusion.    CORONARY ARTERY CALCIFICATION: No coronary artery calcification.    HEPATOBILIARY: Liver is normal.  No calcified gallstones or bile duct dilatation.     PANCREAS: Normal.    SPLEEN: Spleen size normal.    ADRENAL GLANDS: Normal.    KIDNEY/BLADDER: Kidneys, ureters and bladder are normal.    BOWEL: Minimal perihepatic, right subdiaphragmatic and pericolic gutter hemoperitoneum. Normal appendix. Colonic diverticulosis. Bowel is otherwise normal with no obstruction or inflammatory change.    LYMPH NODES: No lymphadenopathy.    VASCULATURE: Numerous tortuous dilated gonadal vein varices, left greater than right. Normal caliber abdominal aorta. No visceral artery aneurysm identified.    PELVIC ORGANS: Moderate to large volume of pelvic hemoperitoneum and some organized clot with epicenter appearing to be at the left adnexa where there is a 3 cm circumscribed benign-appearing cyst and numerous tortuous dilated left gonadal vein varices   (as mentioned above).    MUSCULOSKELETAL: Unremarkable.      Impression    IMPRESSION:   1.  Moderate to large  volume of pelvic hemoperitoneum (and minimal perihepatic and right subdiaphragmatic hemoperitoneum) with epicenter appearing to be at the left adnexa where there is a 3 cm circumscribed benign-appearing cyst and numerous tortuous   dilated left gonadal vein although the definite source of bleeding is not identified. Recommend gynecology consultation and pelvic ultrasound. MRI pelvis may be helpful if the source of bleeding is not identified at ultrasound.  2.  No pulmonary emboli.  3.  Colonic diverticulosis with no diverticulitis.  4.  Chest, abdomen and pelvis otherwise normal.   US Pelvis Cmplt w Transvag & Doppler LmtPel Duplex Limited   Result Value    Radiologist flags Suspected active bleeding in the pelvis. (Urgent)    Narrative    EXAM: US PELVIS COMPLETE W TRANSVAGINAL AND DOPPLER LIMITED  LOCATION: [facility]  DATE: 4/2/2024    INDICATION: CT shows hemoperitoneum, left sided ovaries with twisting veins, assess for active bleeding size of hemoperitoneum  COMPARISON: 04/02/2024 at 1347 hours  TECHNIQUE: Transabdominal scans were performed. Endovaginal ultrasound was performed to better visualize the adnexa. Color flow with spectral Doppler and waveform analysis performed.    FINDINGS:    UTERUS: 8.3 x 5.2 x 4.2 cm. Normal in size and position with no masses.    ENDOMETRIUM: 7 mm. Normal smooth endometrium.    RIGHT OVARY: 2.9 x 2.5 x 2.0 cm. Normal arterial and venous duplex flow identified.    LEFT OVARY: 5.3 x 4.0 x 3.8 cm. Normal arterial and venous duplex flow identified. There is a 3.3 x 3.2 x 2.6 cm hemorrhagic cyst.    Large amount of heterogeneous fluid in the pelvis. The volume of fluid appears to increase during the study.      Impression    IMPRESSION:    1.  Large amount of fluid in the pelvis likely representing blood. The volume of blood appears to increase during the study. A site of active bleeding is not appreciated on this study, however.  2.  3.3 cm left ovarian  hemorrhagic cyst.      [Urgent Result: Suspected active bleeding in the pelvis.]    Finding was identified on 4/2/2024 3:49 PM CDT.     Jag Franklin was contacted by me on 4/2/2024 3:59 PM CDT and verbalized understanding of the critical result.         PROCEDURES:   Procedures   Critical Care     Performed by:   Alex Jaimes MD   Authorized by:   Alex Jaimes MD  Total critical care time: 35 minutes (Critical care time was exclusive of separately billable procedures and treating other patients.)    Critical care was necessary to treat or prevent imminent or life-threatening deterioration of the following conditions: Hemoperitoneum requiring serial hemoglobin checks, surgery consultation, admission    Critical care was time spent personally by me on the following activities:  - obtaining history from patient or surrogate  - examination of patient  - development of treatment plan with patient or surrogate  - ordering and performing treatments and interventions  - ordering and review of laboratory studies  - ordering and review of radiographic studies  - re-evaluation of patient's condition  - monitoring for potential decompensation  - discussion with consultants  ---------------------------------------------------------------------------------------------------------------------  ---------------------------------------------------------------------------------------------------------------------          --------------- ADDITIONAL MDM ---------------  History:  - I considered systemic symptoms of the presenting illness.  - Supplemental history from:       -- patient, mother  - External Record(s) reviewed:       -- Inpatient/outpatient record, prior labs, prior imaging       -- see above ED course & MDM for further details    Workup:  - Chart documentation above includes differential considered and any EKGs or imaging independently interpreted by provider.  - In additional to work up documented, I  considered the following work up:       -- see above ED course & MDM for further details    External Consultation:  - Discussion of management with another provider:       -- see above charting for additional    Complicating Factors:  - Care impacted by chronic illness:       -- see above MDM, past medical history, & problem list  - Care affected by social determinants of health:       -- see above social history       -- Access to Affordable Healthcare    Disposition Considerations:  - Admit                 I, Reynaldo Hopkins, am serving as a scribe to document services personally performed by Dr. Alex Jaimes based on my observation and the provider's statements to me. I, Alex Jaimes MD attest that Reynaldo Hopkins is acting in a scribe capacity, has observed my performance of the services and has documented them in accordance with my direction.      Alex Jaimes MD  04/02/24  Emergency Medicine  [facility] EMERGENCY ROOM  [address] [address]  [phone]  Dept: [phone]      Alex Jaimes MD  04/02/24 6976       Alex Jaimes MD  04/02/24 6708

## 2024-04-02 NOTE — ED PROVIDER NOTES
EMERGENCY DEPARTMENT ENCOUNTER      NAME: Gretchen Voss  AGE: 33 year old female  YOB: 1990  MRN: [id number]  EVALUATION DATE & TIME: No admission date for patient encounter.    PCP: Pipap Mcnamara    ED PROVIDER: Jag Franklin PA-C      Chief Complaint   Patient presents with    Abdominal Pain         FINAL IMPRESSION:  1. Diffuse abdominal pain    2. Right-sided chest wall pain    3. Hemoperitoneum        ED COURSE & MEDICAL DECISION MAKING:    Pertinent Labs & Imaging studies reviewed. (See chart for details)  11:45 AM I met the patient and performed my initial interview and exam.   2:37 PM Spoke with Dr. Anil CORONA who recommends ultrasound the pelvis to assess for signs of hemoperitoneum, as well as possible active bleeding  2:38 PM Patient updated on imaging   4:00 PM Spoke with Dr. Fahrner, [facility] who reports patient with appearing increasing blood products throughout the pelvis during exam but cannot visualize active bleed.   4:04 PM Staffed with Dr. Jaimes  4:17 PM  Spoke with Dr. Anil CORONA who recommends repeat hemoglobin, plan for possible surgical intervention if acute changes, patient updated on plan.   4:48 PM Spoke with Dr. Ma, [facility], OB not particularly concerned with drop in hemoglobin here in the emergency department, at this point, would recommend close follow-up as an outpatient versus admission for ongoing serial hemoglobins.  Will discuss with patient.  5:12 PM patient feels more comfortable staying here in the emergency department for trending of hemoglobin centimeter that there is no acute worsening of bleeding.  Patient be admitted to [facility] to OB/GYN.  Patient mended to Dr. Ma.  She is agreeable with this plan.    33 year old female presents to the Emergency Department for evaluation of abdominal pain.    ED Course as of 04/02/24 1648   Tue Apr 02, 2024   1152 Patient is a 33-year-old female, no significant past medical history of anxiety,  presents emergency department for evaluation of generalized abdominal pain, bloating, diarrhea that woke her up at around 1 AM this morning.  Patient reports that she has had 4 episodes of diarrhea.  Feels little bit dizzy, and short of breath.  On arrival here, she is notably tachycardic, however afebrile, not hypotensive or hypoxic.  On my examination here, has diffuse abdominal pain, without rebound or guarding.  No CVA tenderness.  Does endorse a little bit of pain with urination.  Has not had any blood in her stool.  No vomiting.  No nausea.  Notably tachycardic, however without any murmurs rubs or gallops.  Lung sounds are clear.  Broad differential considered at this point given tachycardia, chest discomfort, as well as abdominal pain.  Will obtain broad laboratory workup including UA, CRP, D-dimer, magnesium, troponin, lipase, BMP, hepatic function, as well as CBC.  Differential considered including atypical ACS, also could be pulmonary embolism, cholecystitis, choledocholithiasis, pancreatitis, acute cystitis, pyelonephritis.  Will obtain basic labs, and reassess.  I did discuss with her additional imaging, however this is largely depend on what her laboratory studies look like here.   1304 CBC here shows no significant leukocytosis, hemoglobin stable.     1312 D-dimer elevated mildly here in the emergency department, in the context of initial tachycardia and complaints of chest tightness, will obtain CT PE study.   1324 CRP here unremarkable, BMP normal.  Hepatic function not significantly elevated, elevation alk phos.   1405 I have independently reviewed the CT PE study, abdomen/pelvis, I do not appreciate any acute perforations or stones, Pending final CT read.   1426 CT Chest (PE) Abdomen Pelvis w Contrast  Moderate to large volume of pelvic hemoperitoneum with epicenter appearing to be at the left adnexa where there is a 3 cm circumscribed benign-appearing cyst with numerous torturous dilated condyle  vein although no definitive source of bleeding identified.  No pulmonary emboli.  No diverticulitis.   1548 I reviewed the ultrasound here, patient has a lot of blood products increasing throughout the examination here.  Not clearly identified source of bleeding.   1559 Spoke with [place] [facility], patient reportedly having increasing blood throughout the ultrasound study, however no appreciated evidence of active bleeding here on exam.  Suspect likely secondary to hemorrhagic cyst.   1601 US Pelvis Cmplt w Transvag & Doppler LmtPel Duplex Limited(!)  Final read ultrasound here shows large amount of fluid in the pelvis likely representing blood.  The blood appears to be increased during the study.  No active site of bleeding.   1616 Patient seen and reevaluated, updated again.  Spoke with OB/GYN here again, was recommending repeat hemoglobin.  Patient examination without significant changes, , however not rigid or distended.  Will be requesting repeat hemoglobin, and reassessment and rediscussion following this.   1637 Hemoglobin here on repeat 10.9.       Medical Decision Making  Obtained supplemental history:Supplemental history obtained?: No  Reviewed external records: External records reviewed?: Outpatient Record: Patient telemedicine visit from [facility], outpatient office visit on 03/05/2024  Care impacted by chronic illness:Mental Health  Care significantly affected by social determinants of health:N/A  Did you consider but not order tests?: Work up considered but not performed and documented in chart, if applicable  Did you interpret images independently?: Independent interpretation of ECG and images noted in documentation, when applicable.  Consultation discussion with other provider:Did you involve another provider (consultant, , pharmacy, etc.)?: No  Admit.       At the conclusion of the encounter I discussed the results of all of the tests and the disposition. The questions were answered. The  patient or family acknowledged understanding and was agreeable with the care plan.       0 minutes of critical care time     MEDICATIONS GIVEN IN THE EMERGENCY:  Medications   sodium chloride 0.9% BOLUS 1,000 mL (1,000 mLs Intravenous $New Bag 4/2/24 1216)   iopamidol (ISOVUE-370) solution 90 mL (90 mLs Intravenous $Given 4/2/24 1349)       NEW PRESCRIPTIONS STARTED AT TODAY'S ER VISIT  New Prescriptions    No medications on file          =================================================================    HPI    Patient information was obtained from: Patient     Use of : N/A        Gretchen Voss is a 33 year old female with a pertinent history of anxiety, elevated TSH, thyroid disease who presents to this ED for evaluation of generalized abdominal pain, bloating, diarrhea that woke her up at around 1 AM this morning.  Patient reportedly also feeling somewhat short of breath and dizzy.  Has had 4 sets of diarrhea.  Patient denies feeling nauseous.  No blood in her stool.  No one is at home sick.  Denies any other medical history.  No fevers.  No recent long plane rides or travel.  Not on any hormonal treatment, or birth control.  Denies any chance of pregnancy.    PAST MEDICAL HISTORY:  No past medical history on file.    PAST SURGICAL HISTORY:  No past surgical history on file.        CURRENT MEDICATIONS:    mupirocin (BACTROBAN) 2 % ointment  prenatal no115-iron-folic acid 29 mg iron- 1 mg Chew         ALLERGIES:  Allergies   Allergen Reactions    Cephalexin Hives     Pt states she doesn't remember what her allergy was.       FAMILY HISTORY:  No family history on file.    SOCIAL HISTORY:   Social History     Socioeconomic History    Marital status:    Tobacco Use    Smoking status: Never    Smokeless tobacco: Never   Substance and Sexual Activity    Alcohol use: No    Drug use: No    Sexual activity: Yes     Partners: Male       VITALS:  /68   Pulse 82   Temp 98.5  F (36.9  C) (Oral)    "Resp 18   Ht 1.676 m (5' 6\")   Wt 63.5 kg (140 lb)   LMP 03/02/2024   SpO2 100%   BMI 22.60 kg/m      PHYSICAL EXAM    Physical Exam  Vitals and nursing note reviewed.   Constitutional:       General: She is not in acute distress.     Appearance: Normal appearance. She is normal weight. She is not toxic-appearing or diaphoretic.   HENT:      Right Ear: External ear normal.      Left Ear: External ear normal.   Eyes:      Conjunctiva/sclera: Conjunctivae normal.   Cardiovascular:      Rate and Rhythm: Regular rhythm. Tachycardia present.      Heart sounds: Normal heart sounds. No murmur heard.     No friction rub. No gallop.   Pulmonary:      Effort: Pulmonary effort is normal. No respiratory distress.      Breath sounds: No stridor. No wheezing or rales.   Abdominal:      General: There is no distension.      Tenderness: There is generalized abdominal tenderness. There is no right CVA tenderness, left CVA tenderness, guarding or rebound.   Neurological:      Mental Status: She is alert. Mental status is at baseline.           LAB:  All pertinent labs reviewed and interpreted.  Labs Ordered and Resulted from Time of ED Arrival to Time of ED Departure   BASIC METABOLIC PANEL - Abnormal       Result Value    Sodium 141      Potassium 3.7      Chloride 105      Carbon Dioxide (CO2) 27      Anion Gap 9      Urea Nitrogen 16.6      Creatinine 0.81      GFR Estimate >90      Calcium 10.2 (*)     Glucose 111 (*)    HEPATIC FUNCTION PANEL - Abnormal    Protein Total 7.1      Albumin 4.6      Bilirubin Total 0.8      Alkaline Phosphatase 33 (*)     AST 12      ALT 9      Bilirubin Direct <0.20     D DIMER QUANTITATIVE - Abnormal    D-Dimer Quantitative 0.76 (*)    ROUTINE UA WITH MICROSCOPIC REFLEX TO CULTURE - Abnormal    Color Urine Yellow      Appearance Urine Clear      Glucose Urine Negative      Bilirubin Urine Negative      Ketones Urine Negative      Specific Gravity Urine 1.033 (*)     Blood Urine Negative      " pH Urine 5.5      Protein Albumin Urine 10 (*)     Urobilinogen Urine <2.0      Nitrite Urine Negative      Leukocyte Esterase Urine Negative      Mucus Urine Present (*)     RBC Urine 1      WBC Urine 5      Squamous Epithelials Urine 4 (*)    CBC WITH PLATELETS AND DIFFERENTIAL - Abnormal    WBC Count 10.1      RBC Count 3.67 (*)     Hemoglobin 11.5 (*)     Hematocrit 34.6 (*)     MCV 94      MCH 31.3      MCHC 33.2      RDW 12.1      Platelet Count 309      % Neutrophils 84      % Lymphocytes 10      % Monocytes 6      % Eosinophils 0      % Basophils 0      % Immature Granulocytes 0      NRBCs per 100 WBC 0      Absolute Neutrophils 8.4 (*)     Absolute Lymphocytes 1.1      Absolute Monocytes 0.6      Absolute Eosinophils 0.0      Absolute Basophils 0.0      Absolute Immature Granulocytes 0.0      Absolute NRBCs 0.0     LIPASE - Normal    Lipase 21     TROPONIN T, HIGH SENSITIVITY - Normal    Troponin T, High Sensitivity <6     MAGNESIUM - Normal    Magnesium 2.0     CRP INFLAMMATION - Normal    CRP Inflammation 3.44     HCG QUALITATIVE URINE - Normal    hCG Urine Qualitative Negative         RADIOLOGY:  Reviewed all pertinent imaging. Please see official radiology report.  CT Chest (PE) Abdomen Pelvis w Contrast   Final Result   IMPRESSION:    1.  Moderate to large volume of pelvic hemoperitoneum (and minimal perihepatic and right subdiaphragmatic hemoperitoneum) with epicenter appearing to be at the left adnexa where there is a 3 cm circumscribed benign-appearing cyst and numerous tortuous    dilated left gonadal vein although the definite source of bleeding is not identified. Recommend gynecology consultation and pelvic ultrasound. MRI pelvis may be helpful if the source of bleeding is not identified at ultrasound.   2.  No pulmonary emboli.   3.  Colonic diverticulosis with no diverticulitis.   4.  Chest, abdomen and pelvis otherwise normal.          EKG:    Performed at: 1209    Impression: Sinus  tachycardia    Rate: 101  Rhythm: Sinus   Axis: 67 -45 46  FL Interval: 124  QRS Interval: 86  QTc Interval: 433  ST Changes: No ST elevation or depression  Comparison: No previous     I have reviewed and interpreted the EKG(s) documented above along with Dr. Karel ED MD.    PROCEDURES:   None.     Jag Franklin PA-C  Emergency Medicine  [facility] EMERGENCY ROOM  [facility] [facility] [id number] [phone]  Dept: [phone]       Jag Franklin PA-C  04/02/24 1711

## 2024-04-03 VITALS
DIASTOLIC BLOOD PRESSURE: 64 MMHG | HEIGHT: 66 IN | HEART RATE: 71 BPM | BODY MASS INDEX: 22.5 KG/M2 | SYSTOLIC BLOOD PRESSURE: 104 MMHG | WEIGHT: 140 LBS | RESPIRATION RATE: 18 BRPM | OXYGEN SATURATION: 100 % | TEMPERATURE: 98.5 F

## 2024-04-03 LAB
ERYTHROCYTE [DISTWIDTH] IN BLOOD BY AUTOMATED COUNT: 12.2 % (ref 10–15)
HCT VFR BLD AUTO: 30.8 % (ref 35–47)
HGB BLD-MCNC: 10.1 G/DL (ref 11.7–15.7)
HOLD SPECIMEN: NORMAL
MCH RBC QN AUTO: 31.1 PG (ref 26.5–33)
MCHC RBC AUTO-ENTMCNC: 32.8 G/DL (ref 31.5–36.5)
MCV RBC AUTO: 95 FL (ref 78–100)
PLATELET # BLD AUTO: 254 10E3/UL (ref 150–450)
RBC # BLD AUTO: 3.25 10E6/UL (ref 3.8–5.2)
WBC # BLD AUTO: 6.6 10E3/UL (ref 4–11)

## 2024-04-03 PROCEDURE — 36415 COLL VENOUS BLD VENIPUNCTURE: CPT | Performed by: STUDENT IN AN ORGANIZED HEALTH CARE EDUCATION/TRAINING PROGRAM

## 2024-04-03 PROCEDURE — 250N000011 HC RX IP 250 OP 636: Performed by: STUDENT IN AN ORGANIZED HEALTH CARE EDUCATION/TRAINING PROGRAM

## 2024-04-03 PROCEDURE — 96376 TX/PRO/DX INJ SAME DRUG ADON: CPT

## 2024-04-03 PROCEDURE — 85027 COMPLETE CBC AUTOMATED: CPT | Performed by: STUDENT IN AN ORGANIZED HEALTH CARE EDUCATION/TRAINING PROGRAM

## 2024-04-03 PROCEDURE — G0378 HOSPITAL OBSERVATION PER HR: HCPCS

## 2024-04-03 RX ADMIN — FAMOTIDINE 20 MG: 10 INJECTION, SOLUTION INTRAVENOUS at 06:18

## 2024-04-03 ASSESSMENT — ACTIVITIES OF DAILY LIVING (ADL)
ADLS_ACUITY_SCORE: 35

## 2024-04-03 NOTE — H&P
Date: 2024  Time: 7:01 PM    Admission H&P  Gretchen Voss,  1990, MRN [id number]    PCP: Kiesha Barboza, [phone]     Code status:  Full Code              Chief Complaint: Hemorrhagic cyst of left ovary           OB History    Para Term  AB Living   6 3 3 0 2 2   SAB IAB Ectopic Multiple Live Births   2 0 0 0 2      # Outcome Date GA Lbr Benedict/2nd Weight Sex Delivery Anes PTL Lv   6 Term 21 40w4d 00:50 / 00:11 3.544 kg (7 lb 13 oz) M Vag-Spont EPI N LIBIA      Name: MUNAMALE-GRETCHEN      Apgar1: 8  Apgar5: 9   5 Term 10/12/18 39w5d 03:38 / 00:19 3.572 kg (7 lb 14 oz) F Vag-Spont EPI N LIBIA      Name: MUNAFEMALE-GRETCHEN      Apgar1: 9  Apgar5: 9   4             3 SAB            2 SAB            1 Term                HPI:    Gretchen Voss is a 33 year old year old who presents to ED tonight with pelvic pain.  She usually has regular, monthly menses.  She is about due for a period.  She is not using contraception.  She does not follow with an OB/GYN currently.  She has had 2 babies, delivered vaginally most recently in May of 2021.    She reports she did have sex last night, pain started overnight worsening this morning.  She presented to the ED this morning for evaluation.  Imaging suggests likely hemorrhagic cyst, hemoglobin has thus far been stable and pain overall well-controlled.  She is admitted now for serial hemoglobin.    Medical History  No past medical history on file.    Surgical History  No past surgical history on file.    Social History  Social History     Socioeconomic History    Marital status:      Spouse name: Not on file    Number of children: Not on file    Years of education: Not on file    Highest education level: Not on file   Occupational History    Not on file   Tobacco Use    Smoking status: Never    Smokeless tobacco: Never   Substance and Sexual Activity    Alcohol use: No    Drug use: No    Sexual activity: Yes     Partners: Male   Other  "Topics Concern    Not on file   Social History Narrative    Not on file     Social Determinants of Health     Financial Resource Strain: Not on file   Food Insecurity: Not on file   Transportation Needs: Not on file   Physical Activity: Not on file   Stress: Not on file   Social Connections: Not on file   Interpersonal Safety: Not on file   Housing Stability: Not on file       Allergies   Allergen Reactions    Cephalexin Hives     Pt states she doesn't remember what her allergy was.       (Not in a hospital admission)      Review of Systems:  Otherwise negative except per HPI    Physical Exam:  Temp:  [98.5  F (36.9  C)] 98.5  F (36.9  C)  Pulse:  [] 92  Resp:  [16-18] 18  BP: (110-127)/(68-76) 127/76  SpO2:  [99 %-100 %] 100 %    /76   Pulse 92   Temp 98.5  F (36.9  C) (Oral)   Resp 18   Ht 1.676 m (5' 6\")   Wt 63.5 kg (140 lb)   LMP 03/02/2024   SpO2 100%   BMI 22.60 kg/m      General: NAD  CV: RRR  Lungs: clear  Abdomen: soft, mildly tender, no rebound/guarding    Pertinent Labs  Admission on 04/02/2024   Component Date Value Ref Range Status    Sodium 04/02/2024 141  135 - 145 mmol/L Final    Reference intervals for this test were updated on 09/26/2023 to more accurately reflect our healthy population. There may be differences in the flagging of prior results with similar values performed with this method. Interpretation of those prior results can be made in the context of the updated reference intervals.     Potassium 04/02/2024 3.7  3.4 - 5.3 mmol/L Final    Chloride 04/02/2024 105  98 - 107 mmol/L Final    Carbon Dioxide (CO2) 04/02/2024 27  22 - 29 mmol/L Final    Anion Gap 04/02/2024 9  7 - 15 mmol/L Final    Urea Nitrogen 04/02/2024 16.6  6.0 - 20.0 mg/dL Final    Creatinine 04/02/2024 0.81  0.51 - 0.95 mg/dL Final    GFR Estimate 04/02/2024 >90  >60 mL/min/1.73m2 Final    Calcium 04/02/2024 10.2 (H)  8.6 - 10.0 mg/dL Final    Glucose 04/02/2024 111 (H)  70 - 99 mg/dL Final    Protein " Total 04/02/2024 7.1  6.4 - 8.3 g/dL Final    Albumin 04/02/2024 4.6  3.5 - 5.2 g/dL Final    Bilirubin Total 04/02/2024 0.8  <=1.2 mg/dL Final    Alkaline Phosphatase 04/02/2024 33 (L)  40 - 150 U/L Final    Reference intervals for this test were updated on 11/14/2023 to more accurately reflect our healthy population. There may be differences in the flagging of prior results with similar values performed with this method. Interpretation of those prior results can be made in the context of the updated reference intervals.    AST 04/02/2024 12  0 - 45 U/L Final    Reference intervals for this test were updated on 6/12/2023 to more accurately reflect our healthy population. There may be differences in the flagging of prior results with similar values performed with this method. Interpretation of those prior results can be made in the context of the updated reference intervals.    ALT 04/02/2024 9  0 - 50 U/L Final    Reference intervals for this test were updated on 6/12/2023 to more accurately reflect our healthy population. There may be differences in the flagging of prior results with similar values performed with this method. Interpretation of those prior results can be made in the context of the updated reference intervals.      Bilirubin Direct 04/02/2024 <0.20  0.00 - 0.30 mg/dL Final    Lipase 04/02/2024 21  13 - 60 U/L Final    Troponin T, High Sensitivity 04/02/2024 <6  <=14 ng/L Final    Either a High Sensitivity Troponin T baseline (0 hours) value = 100 ng/L, or an increase in High Sensitivity Troponin T = 7 ng/L at 2 hours compared to 0 hours (2-0 hours), suggests myocardial injury, and urgent clinical attention is required.    If the 2-0 hours increase is <7 ng/L, a High Sensitivity Troponin T result above gender-specific reference ranges warrants further evaluation.   Recommendations for further evaluation include correlation with clinical decision-making tool (e.g., HEART), a 3rd High Sensitivity  Troponin T test 2 hours after the 2nd (a 20% change from baseline would represent concern), admission for observation, close PCC/cardiology follow-up, or urgent outpatient provocative testing.    Magnesium 04/02/2024 2.0  1.7 - 2.3 mg/dL Final    D-Dimer Quantitative 04/02/2024 0.76 (H)  0.00 - 0.50 ug/mL FEU Final    CRP Inflammation 04/02/2024 3.44  <5.00 mg/L Final    Color Urine 04/02/2024 Yellow  Colorless, Straw, Light Yellow, Yellow Final    Appearance Urine 04/02/2024 Clear  Clear Final    Glucose Urine 04/02/2024 Negative  Negative mg/dL Final    Bilirubin Urine 04/02/2024 Negative  Negative Final    Ketones Urine 04/02/2024 Negative  Negative mg/dL Final    Specific Gravity Urine 04/02/2024 1.033 (H)  1.001 - 1.030 Final    Blood Urine 04/02/2024 Negative  Negative Final    pH Urine 04/02/2024 5.5  5.0 - 7.0 Final    Protein Albumin Urine 04/02/2024 10 (A)  Negative mg/dL Final    Urobilinogen Urine 04/02/2024 <2.0  <2.0 mg/dL Final    Nitrite Urine 04/02/2024 Negative  Negative Final    Leukocyte Esterase Urine 04/02/2024 Negative  Negative Final    Mucus Urine 04/02/2024 Present (A)  None Seen /LPF Final    RBC Urine 04/02/2024 1  <=2 /HPF Final    WBC Urine 04/02/2024 5  <=5 /HPF Final    Squamous Epithelials Urine 04/02/2024 4 (H)  <=1 /HPF Final    hCG Urine Qualitative 04/02/2024 Negative  Negative Final    This test is for screening purposes.  Results should be interpreted along with the clinical picture.  Confirmation testing is available if warranted by ordering [id number], HCG Quantitative Pregnancy.    WBC Count 04/02/2024 10.1  4.0 - 11.0 10e3/uL Final    RBC Count 04/02/2024 3.67 (L)  3.80 - 5.20 10e6/uL Final    Hemoglobin 04/02/2024 11.5 (L)  11.7 - 15.7 g/dL Final    Hematocrit 04/02/2024 34.6 (L)  35.0 - 47.0 % Final    MCV 04/02/2024 94  78 - 100 fL Final    MCH 04/02/2024 31.3  26.5 - 33.0 pg Final    MCHC 04/02/2024 33.2  31.5 - 36.5 g/dL Final    RDW 04/02/2024 12.1  10.0 - 15.0 % Final     Platelet Count 04/02/2024 309  150 - 450 10e3/uL Final    % Neutrophils 04/02/2024 84  % Final    % Lymphocytes 04/02/2024 10  % Final    % Monocytes 04/02/2024 6  % Final    % Eosinophils 04/02/2024 0  % Final    % Basophils 04/02/2024 0  % Final    % Immature Granulocytes 04/02/2024 0  % Final    NRBCs per 100 WBC 04/02/2024 0  <1 /100 Final    Absolute Neutrophils 04/02/2024 8.4 (H)  1.6 - 8.3 10e3/uL Final    Absolute Lymphocytes 04/02/2024 1.1  0.8 - 5.3 10e3/uL Final    Absolute Monocytes 04/02/2024 0.6  0.0 - 1.3 10e3/uL Final    Absolute Eosinophils 04/02/2024 0.0  0.0 - 0.7 10e3/uL Final    Absolute Basophils 04/02/2024 0.0  0.0 - 0.2 10e3/uL Final    Absolute Immature Granulocytes 04/02/2024 0.0  <=0.4 10e3/uL Final    Absolute NRBCs 04/02/2024 0.0  10e3/uL Final    Radiologist flags 04/02/2024 Suspected active bleeding in the pelvis. (Urgent)   Final    Hemoglobin 04/02/2024 10.9 (L)  11.7 - 15.7 g/dL Final    ABO/RH(D) 04/02/2024 A POS   Final    Antibody Screen 04/02/2024 Negative  Negative Final    SPECIMEN EXPIRATION DATE 04/02/2024 [id number]   Final       Pertinent Radiology:   Narrative & Impression   EXAM: US PELVIS COMPLETE W TRANSVAGINAL AND DOPPLER LIMITED  LOCATION: [facility]  DATE: 4/2/2024     INDICATION: CT shows hemoperitoneum, left sided ovaries with twisting veins, assess for active bleeding size of hemoperitoneum  COMPARISON: 04/02/2024 at 1347 hours  TECHNIQUE: Transabdominal scans were performed. Endovaginal ultrasound was performed to better visualize the adnexa. Color flow with spectral Doppler and waveform analysis performed.     FINDINGS:     UTERUS: 8.3 x 5.2 x 4.2 cm. Normal in size and position with no masses.     ENDOMETRIUM: 7 mm. Normal smooth endometrium.     RIGHT OVARY: 2.9 x 2.5 x 2.0 cm. Normal arterial and venous duplex flow identified.     LEFT OVARY: 5.3 x 4.0 x 3.8 cm. Normal arterial and venous duplex flow identified. There is a 3.3 x  3.2 x 2.6 cm hemorrhagic cyst.     Large amount of heterogeneous fluid in the pelvis. The volume of fluid appears to increase during the study.                                                                      IMPRESSION:    1.  Large amount of fluid in the pelvis likely representing blood. The volume of blood appears to increase during the study. A site of active bleeding is not appreciated on this study, however.  2.  3.3 cm left ovarian hemorrhagic cyst.     EXAM: CT CHEST PE ABDOMEN PELVIS W CONTRAST  LOCATION: [facility]  DATE: 4/2/2024     INDICATION: Elevated d-dimer, shortness of breath and diffuse right-sided abdominal pain. Negative pregnancy test.  COMPARISON: None.  TECHNIQUE: CT chest pulmonary angiogram and routine CT abdomen pelvis with IV contrast. Arterial phase through the chest and venous phase through the abdomen and pelvis. Multiplanar reformats and MIP reconstructions were performed. Dose reduction   techniques were used.   CONTRAST: 90ml Isovue 370     FINDINGS:  ANGIOGRAM CHEST: Pulmonary arteries are normal caliber with no pulmonary emboli. Normal caliber thoracic aorta with no acute aortic syndrome.      LUNGS AND PLEURA: Lungs are clear. No pleural effusion.     MEDIASTINUM/AXILLAE: No lymphadenopathy. Heart size within normal limits. No pericardial effusion.     CORONARY ARTERY CALCIFICATION: No coronary artery calcification.     HEPATOBILIARY: Liver is normal.  No calcified gallstones or bile duct dilatation.      PANCREAS: Normal.     SPLEEN: Spleen size normal.     ADRENAL GLANDS: Normal.     KIDNEY/BLADDER: Kidneys, ureters and bladder are normal.     BOWEL: Minimal perihepatic, right subdiaphragmatic and pericolic gutter hemoperitoneum. Normal appendix. Colonic diverticulosis. Bowel is otherwise normal with no obstruction or inflammatory change.     LYMPH NODES: No lymphadenopathy.     VASCULATURE: Numerous tortuous dilated gonadal vein varices, left  greater than right. Normal caliber abdominal aorta. No visceral artery aneurysm identified.     PELVIC ORGANS: Moderate to large volume of pelvic hemoperitoneum and some organized clot with epicenter appearing to be at the left adnexa where there is a 3 cm circumscribed benign-appearing cyst and numerous tortuous dilated left gonadal vein varices   (as mentioned above).     MUSCULOSKELETAL: Unremarkable.                                                                      IMPRESSION:   1.  Moderate to large volume of pelvic hemoperitoneum (and minimal perihepatic and right subdiaphragmatic hemoperitoneum) with epicenter appearing to be at the left adnexa where there is a 3 cm circumscribed benign-appearing cyst and numerous tortuous   dilated left gonadal vein although the definite source of bleeding is not identified. Recommend gynecology consultation and pelvic ultrasound. MRI pelvis may be helpful if the source of bleeding is not identified at ultrasound.  2.  No pulmonary emboli.  3.  Colonic diverticulosis with no diverticulitis.  4.  Chest, abdomen and pelvis otherwise normal.    Impression/Plan:  1. Hemorrhagic cyst:  -Imaging shows hemorrhagic cyst.  Hemoglobin thus far has been stable and patient overall comfortable.  -Discussed ruptured hemorrhagic cyst can be painful, often reabsorb without intervention, imaging did not indicate active bleeding.  Discussed however that if hemoglobin drops or if pain increases then surgery may be warranted.    -Will admit now for pain control and serial hemoglobin.  Will keep NPO in case surgery becomes indicated.    -Patient appears overall comfortable in ED    Provider: Tabatha Farley MD    Date: 2024  Time: 7:01 PM    Gretchen Voss,  1990, MRN [id number]

## 2024-04-03 NOTE — PROGRESS NOTES
PRIMARY DIAGNOSIS: ACUTE PAIN  OUTPATIENT/OBSERVATION GOALS TO BE MET BEFORE DISCHARGE:  1. Pain Status: Improved-controlled with oral pain medications.    2. Return to near baseline physical activity: Yes    3. Cleared for discharge by consultants (if involved): N/A    Discharge Planner Nurse   Safe discharge environment identified: Yes  Barriers to discharge: Yes       Entered by: Sylvia Astudillo RN 04/03/2024 4:01 AM     Please review provider order for any additional goals.   Nurse to notify provider when observation goals have been met and patient is ready for discharge.

## 2024-04-03 NOTE — PLAN OF CARE
Problem: Adult Inpatient Plan of Care  Goal: Optimal Comfort and Wellbeing  Intervention: Monitor Pain and Promote Comfort  Recent Flowsheet Documentation  Taken 4/3/2024 0911 by Esmer Arias RN  Pain Management Interventions: medication offered but refused     Problem: Adult Inpatient Plan of Care  Goal: Absence of Hospital-Acquired Illness or Injury  Intervention: Identify and Manage Fall Risk  Recent Flowsheet Documentation  Taken 4/3/2024 0911 by Esmer Arias RN  Safety Promotion/Fall Prevention:   safety round/check completed   lighting adjusted     Pt A/O x 4. VSS on RA. Pt reports discomfort in abdominal generalized. Reports more soreness with activity and engaging core muscles. Denies N/V. Voiding spontaneously. Up IND in room. Able to make needs known. Call light in reach. Will discharge this morning to home and advised to follow up with PCP.    Esmer Arias, RN

## 2024-09-29 ENCOUNTER — HEALTH MAINTENANCE LETTER (OUTPATIENT)
Age: 34
End: 2024-09-29